# Patient Record
Sex: FEMALE | Race: WHITE | NOT HISPANIC OR LATINO | Employment: STUDENT | URBAN - METROPOLITAN AREA
[De-identification: names, ages, dates, MRNs, and addresses within clinical notes are randomized per-mention and may not be internally consistent; named-entity substitution may affect disease eponyms.]

---
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
## 2022-01-05 ENCOUNTER — ANNUAL EXAM (OUTPATIENT)
Dept: OBGYN CLINIC | Facility: CLINIC | Age: 22
End: 2022-01-05
Payer: COMMERCIAL

## 2022-01-05 VITALS
SYSTOLIC BLOOD PRESSURE: 98 MMHG | BODY MASS INDEX: 23.16 KG/M2 | WEIGHT: 118 LBS | DIASTOLIC BLOOD PRESSURE: 60 MMHG | HEIGHT: 60 IN

## 2022-01-05 DIAGNOSIS — Z30.011 ENCOUNTER FOR PRESCRIPTION OF ORAL CONTRACEPTIVES: ICD-10-CM

## 2022-01-05 DIAGNOSIS — Z01.419 ENCOUNTER FOR GYNECOLOGICAL EXAMINATION WITHOUT ABNORMAL FINDING: Primary | ICD-10-CM

## 2022-01-05 DIAGNOSIS — Z12.4 ENCOUNTER FOR PAPANICOLAOU SMEAR FOR CERVICAL CANCER SCREENING: ICD-10-CM

## 2022-01-05 PROCEDURE — S0610 ANNUAL GYNECOLOGICAL EXAMINA: HCPCS | Performed by: NURSE PRACTITIONER

## 2022-01-05 RX ORDER — NORGESTIMATE AND ETHINYL ESTRADIOL 7DAYSX3 28
KIT ORAL
COMMUNITY
Start: 2021-11-02 | End: 2022-01-09 | Stop reason: SDUPTHER

## 2022-01-05 NOTE — PATIENT INSTRUCTIONS
Pap every 3 years if normal, STI testing as indicated, exercise most days of week, obtain appropriate diet and hydration, Calcium 1000mg + 600 vit D daily, birth control as directed  Condom use when sexually active for sexually transmitted infection prevention      Annual mammogram starting at age 36, monthly breast self exam

## 2022-01-05 NOTE — PROGRESS NOTES
Assessment/Plan:  Pap every 3 years if normal, STI testing as indicated, exercise most days of week, obtain appropriate diet and hydration, Calcium 1000mg + 600 vit D daily, birth control as directed  Condom use when sexually active for sexually transmitted infection prevention     Annual mammogram starting at age 36, monthly breast self exam        Diagnoses and all orders for this visit:    Encounter for gynecological examination without abnormal finding  -     Thinprep Tis Pap (Refl) HPV mRNA E6/E7    Encounter for Papanicolaou smear for cervical cancer screening  -     Thinprep Tis Pap (Refl) HPV mRNA E6/E7    Encounter for prescription of oral contraceptives  -     Tri Femynor 0 18/0 215/0 25 MG-35 MCG per tablet; Take 1 tablet by mouth daily    Other orders  -     Discontinue: Tri Femynor 0 18/0 215/0 25 MG-35 MCG per tablet          Subjective:      Patient ID: Milo Crook is a 24 y o  female  Here for annual gyn On OCP periods monthly normal no issues  Denies abdominal or pelvic pain  Bowel and bladder are normal        The following portions of the patient's history were reviewed and updated as appropriate: allergies, current medications, past family history, past medical history, past social history, past surgical history and problem list     Review of Systems   Constitutional: Negative for fatigue and unexpected weight change  Respiratory: Negative for shortness of breath  Cardiovascular: Negative for chest pain and palpitations  Gastrointestinal: Negative for abdominal distention, abdominal pain, constipation and diarrhea  Genitourinary: Negative for difficulty urinating, dyspareunia, dysuria, frequency, genital sores, menstrual problem, pelvic pain, urgency, vaginal bleeding, vaginal discharge and vaginal pain  Neurological: Negative for headaches  Psychiatric/Behavioral: Negative  Negative for dysphoric mood  The patient is not nervous/anxious            Objective:      BP 98/60   Ht 5' 0 25" (1 53 m)   Wt 53 5 kg (118 lb)   LMP 12/13/2021 (Exact Date)   Breastfeeding No   BMI 22 85 kg/m²          Physical Exam  Vitals and nursing note reviewed  Constitutional:       General: She is not in acute distress  Appearance: Normal appearance  HENT:      Head: Normocephalic and atraumatic  Pulmonary:      Effort: Pulmonary effort is normal    Chest:   Breasts: Breasts are symmetrical       Right: Normal  No mass, nipple discharge, skin change, tenderness or axillary adenopathy  Left: Normal  No mass, nipple discharge, skin change, tenderness or axillary adenopathy  Abdominal:      General: There is no distension  Palpations: Abdomen is soft  Tenderness: There is no abdominal tenderness  There is no guarding or rebound  Genitourinary:     General: Normal vulva  Exam position: Lithotomy position  Labia:         Right: No rash, tenderness, lesion or injury  Left: No rash, tenderness, lesion or injury  Urethra: No prolapse, urethral pain, urethral swelling or urethral lesion  Vagina: Normal  No erythema or lesions  Cervix: No cervical motion tenderness, discharge, lesion or cervical bleeding  Uterus: Normal        Adnexa: Right adnexa normal and left adnexa normal         Right: No mass or tenderness  Left: No mass or tenderness  Rectum: No mass or external hemorrhoid  Comments: PAP from cervix  Musculoskeletal:         General: Normal range of motion  Lymphadenopathy:      Upper Body:      Right upper body: No axillary adenopathy  Left upper body: No axillary adenopathy  Lower Body: No right inguinal adenopathy  No left inguinal adenopathy  Skin:     General: Skin is warm and dry  Neurological:      Mental Status: She is alert and oriented to person, place, and time     Psychiatric:         Mood and Affect: Mood normal          Behavior: Behavior normal          Thought Content: Thought content normal          Judgment: Judgment normal

## 2022-01-09 LAB
CLINICAL INFO: NORMAL
CYTO CVX: NORMAL
CYTOLOGY CMNT CVX/VAG CYTO-IMP: NORMAL
DATE PREVIOUS BX: NORMAL
LMP START DATE: NORMAL
SL AMB PREV. PAP:: NORMAL
SPECIMEN SOURCE CVX/VAG CYTO: NORMAL

## 2022-01-09 RX ORDER — NORGESTIMATE AND ETHINYL ESTRADIOL 7DAYSX3 28
1 KIT ORAL DAILY
Qty: 84 TABLET | Refills: 3 | Status: SHIPPED | OUTPATIENT
Start: 2022-01-09

## 2022-01-11 ENCOUNTER — TELEPHONE (OUTPATIENT)
Dept: OBGYN CLINIC | Facility: CLINIC | Age: 22
End: 2022-01-11

## 2022-01-11 NOTE — TELEPHONE ENCOUNTER
Eduardo(mom) jad/iglesia eaton was in for her well exam last week  Needs one year supply of OCP sent to [facility]   Per epic med log, one year supply transmitted to pharmacy on 1/9/2022

## 2023-01-03 NOTE — PROGRESS NOTES
Assessment/Plan:  Pap every 3 years if normal, STI testing as indicated, exercise most days of week, obtain appropriate diet and hydration, Calcium 1000mg + 600 vit D daily, birth control as directed  Annual mammogram starting at age 36, monthly breast self exam         Diagnoses and all orders for this visit:    Encounter for gynecological examination without abnormal finding    Encounter for prescription of oral contraceptives  -     norgestimate-ethinyl estradiol (ORTHO TRI-CYCLEN,TRINESSA) 0 18/0 215/0 25 MG-35 MCG per tablet; Take 1 tablet by mouth daily    Other orders  -     Liquid-based pap, screening          Subjective:      Patient ID: Tracie Cross is a 25 y o  female  Here for annual gyn On OCP periods monthly normal no issues  Denies BTB unless messes up pills  Denies abdominal or pelvic pain  Bowel and bladder are normal NO unusual discharge NSA PAP 1/5/2022 neg Graduates this May 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 Unsure if will take or look a little bit  The following portions of the patient's history were reviewed and updated as appropriate: allergies, current medications, past family history, past medical history, past social history, past surgical history and problem list     Review of Systems   Constitutional: Negative for fatigue and unexpected weight change  Gastrointestinal: Negative for abdominal distention, abdominal pain, constipation and diarrhea  Genitourinary: Negative for difficulty urinating, dyspareunia, dysuria, frequency, genital sores, menstrual problem, pelvic pain, urgency, vaginal bleeding, vaginal discharge and vaginal pain  Neurological: Negative for headaches  Psychiatric/Behavioral: Negative  Negative for dysphoric mood  The patient is not nervous/anxious            Objective:      /66 (BP Location: Left arm, Patient Position: Sitting, Cuff Size: Standard)   Ht 5' 0 25" (1 53 m)   Wt 58 4 kg (128 lb 12 8 oz) LMP 12/20/2022 (Exact Date)   Breastfeeding No   BMI 24 95 kg/m²          Physical Exam  Vitals and nursing note reviewed  Constitutional:       General: She is not in acute distress  Appearance: Normal appearance  HENT:      Head: Normocephalic and atraumatic  Pulmonary:      Effort: Pulmonary effort is normal    Chest:   Breasts:     Breasts are symmetrical       Right: Normal  No mass, nipple discharge, skin change or tenderness  Left: Normal  No mass, nipple discharge, skin change or tenderness  Abdominal:      General: There is no distension  Palpations: Abdomen is soft  Tenderness: There is no abdominal tenderness  There is no guarding or rebound  Genitourinary:     General: Normal vulva  Exam position: Lithotomy position  Labia:         Right: No rash, tenderness, lesion or injury  Left: No rash, tenderness, lesion or injury  Urethra: No prolapse, urethral pain, urethral swelling or urethral lesion  Vagina: Normal  No erythema or lesions  Cervix: No cervical motion tenderness, discharge, lesion or cervical bleeding  Uterus: Normal        Adnexa: Right adnexa normal and left adnexa normal         Right: No mass or tenderness  Left: No mass or tenderness  Rectum: No mass or external hemorrhoid  Musculoskeletal:         General: Normal range of motion  Lymphadenopathy:      Upper Body:      Right upper body: No axillary adenopathy  Left upper body: No axillary adenopathy  Lower Body: No right inguinal adenopathy  No left inguinal adenopathy  Skin:     General: Skin is warm and dry  Neurological:      Mental Status: She is alert and oriented to person, place, and time  Psychiatric:         Mood and Affect: Mood normal          Behavior: Behavior normal          Thought Content:  Thought content normal          Judgment: Judgment normal

## 2023-01-04 ENCOUNTER — ANNUAL EXAM (OUTPATIENT)
Dept: OBGYN CLINIC | Facility: CLINIC | Age: 23
End: 2023-01-04

## 2023-01-04 VITALS
DIASTOLIC BLOOD PRESSURE: 66 MMHG | SYSTOLIC BLOOD PRESSURE: 100 MMHG | WEIGHT: 128.8 LBS | HEIGHT: 60 IN | BODY MASS INDEX: 25.29 KG/M2

## 2023-01-04 DIAGNOSIS — Z30.011 ENCOUNTER FOR PRESCRIPTION OF ORAL CONTRACEPTIVES: ICD-10-CM

## 2023-01-04 DIAGNOSIS — Z01.419 ENCOUNTER FOR GYNECOLOGICAL EXAMINATION WITHOUT ABNORMAL FINDING: Primary | ICD-10-CM

## 2023-01-04 RX ORDER — NORGESTIMATE AND ETHINYL ESTRADIOL 7DAYSX3 28
1 KIT ORAL DAILY
Qty: 84 TABLET | Refills: 3 | Status: SHIPPED | OUTPATIENT
Start: 2023-01-04

## 2023-01-04 NOTE — PATIENT INSTRUCTIONS
Pap every 3 years if normal, STI testing as indicated, exercise most days of week, obtain appropriate diet and hydration, Calcium 1000mg + 600 vit D daily, birth control as directed  Annual mammogram starting at age 36, monthly breast self exam

## 2023-09-06 ENCOUNTER — TELEPHONE (OUTPATIENT)
Dept: OBGYN CLINIC | Facility: CLINIC | Age: 23
End: 2023-09-06

## 2023-09-06 NOTE — TELEPHONE ENCOUNTER
Pt called the office informing she was recently diagnosed with Myasthenia gravis and is currently on OCP. Pt is considering stopping the birth control and inquiring what side effects she may have stopping birth control. Pt reports she was on the pill to help with acne and irregular cycles. Pt questioning if Berry De Jesus NP has any input regarding stopping or continuing on medication.

## 2023-09-06 NOTE — TELEPHONE ENCOUNTER
Informed patient that Merary Torres pre her research, OCP is safe with Myasthenia she can confirm with neurologist.  If she decides to come off the pill she should finish a pack and not restart another  Periods may be irregular when first coming off and if SA would need to use other contraception as soon as she comes off. Pt verbalized an understanding.

## 2024-01-29 ENCOUNTER — ANNUAL EXAM (OUTPATIENT)
Dept: OBGYN CLINIC | Facility: CLINIC | Age: 24
End: 2024-01-29
Payer: COMMERCIAL

## 2024-01-29 VITALS
BODY MASS INDEX: 25.25 KG/M2 | WEIGHT: 128.6 LBS | HEIGHT: 60 IN | SYSTOLIC BLOOD PRESSURE: 108 MMHG | DIASTOLIC BLOOD PRESSURE: 60 MMHG

## 2024-01-29 DIAGNOSIS — G70.00 MYASTHENIA GRAVIS (HCC): ICD-10-CM

## 2024-01-29 DIAGNOSIS — Z01.419 ENCOUNTER FOR GYNECOLOGICAL EXAMINATION WITHOUT ABNORMAL FINDING: Primary | ICD-10-CM

## 2024-01-29 PROCEDURE — S0612 ANNUAL GYNECOLOGICAL EXAMINA: HCPCS | Performed by: NURSE PRACTITIONER

## 2024-01-29 RX ORDER — PREDNISONE 5 MG/1
5 TABLET ORAL DAILY
COMMUNITY

## 2024-01-29 RX ORDER — PYRIDOSTIGMINE BROMIDE 60 MG/1
60 TABLET ORAL AS NEEDED
COMMUNITY
Start: 2023-12-20

## 2024-01-29 RX ORDER — PREDNISONE 1 MG/1
1 TABLET ORAL
COMMUNITY
Start: 2023-10-26

## 2024-01-29 RX ORDER — IMMUNE GLOBULIN INFUSION (HUMAN) 100 MG/ML
INJECTION, SOLUTION INTRAVENOUS; SUBCUTANEOUS
COMMUNITY
Start: 2023-10-17

## 2024-01-29 NOTE — PROGRESS NOTES
"Assessment/Plan:    Pap every 3 years if normal, STI testing as indicated, exercise most days of week, obtain appropriate diet and hydration, Calcium 1000mg + 600 vit D daily, .   Annual mammogram starting at age 40, monthly breast self exam.      Diagnoses and all orders for this visit:    Encounter for gynecological examination without abnormal finding    Myasthenia gravis (HCC)    Other orders  -     immune globulin, human (GAMMAGARD) 30 GM/300ML  -     predniSONE 1 mg tablet; 1 mg 3 x day  -     predniSONE 5 mg tablet; Take 5 mg by mouth daily  -     pyridostigmine (MESTINON) 60 mg tablet; Take 60 mg by mouth if needed          Subjective:      Patient ID: Bel Gross is a 23 y.o. female.    Here for annual gyn Came off OCP Doing well periods monthly normal no issues. Denies abdominal or pelvic pain.  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    Review of Systems   Constitutional:  Negative for fatigue and unexpected weight change.   Gastrointestinal:  Negative for abdominal distention, abdominal pain, constipation and diarrhea.   Genitourinary:  Negative for difficulty urinating, dyspareunia, dysuria, frequency, genital sores, menstrual problem, pelvic pain, urgency, vaginal bleeding, vaginal discharge and vaginal pain.   Neurological:  Negative for headaches.   Psychiatric/Behavioral: Negative.  Negative for dysphoric mood. The patient is not nervous/anxious.          Objective:      /60   Ht 5' 0.25\" (1.53 m)   Wt 58.3 kg (128 lb 9.6 oz)   LMP 01/11/2024 " (Exact Date)   Breastfeeding No   BMI 24.91 kg/m²          Physical Exam  Vitals and nursing note reviewed.   Constitutional:       General: She is not in acute distress.     Appearance: Normal appearance.   HENT:      Head: Normocephalic and atraumatic.   Pulmonary:      Effort: Pulmonary effort is normal.   Chest:   Breasts:     Breasts are symmetrical.      Right: Normal. No mass, nipple discharge, skin change or tenderness.      Left: Normal. No mass, nipple discharge, skin change or tenderness.   Abdominal:      General: There is no distension.      Palpations: Abdomen is soft.      Tenderness: There is no abdominal tenderness. There is no guarding or rebound.   Genitourinary:     General: Normal vulva.      Exam position: Lithotomy position.      Labia:         Right: No rash, tenderness, lesion or injury.         Left: No rash, tenderness, lesion or injury.       Urethra: No prolapse, urethral pain, urethral swelling or urethral lesion.      Vagina: Normal. No erythema or lesions.      Cervix: No cervical motion tenderness, discharge, lesion or cervical bleeding.      Uterus: Normal.       Adnexa: Right adnexa normal and left adnexa normal.        Right: No mass or tenderness.          Left: No mass or tenderness.        Rectum: No mass or external hemorrhoid.   Musculoskeletal:         General: Normal range of motion.   Lymphadenopathy:      Upper Body:      Right upper body: No axillary adenopathy.      Left upper body: No axillary adenopathy.      Lower Body: No right inguinal adenopathy. No left inguinal adenopathy.   Skin:     General: Skin is warm and dry.   Neurological:      Mental Status: She is alert and oriented to person, place, and time.   Psychiatric:         Mood and Affect: Mood normal.         Behavior: Behavior normal.         Thought Content: Thought content normal.         Judgment: Judgment normal.

## 2024-05-31 ENCOUNTER — TELEPHONE (OUTPATIENT)
Dept: NEUROLOGY | Facility: CLINIC | Age: 24
End: 2024-05-31

## 2024-06-03 NOTE — PROGRESS NOTES
Patient ID: Bel Gross is a 24 y.o. female.    Assessment/Plan:    Myasthenia gravis (HCC)  This patient has a history of seropositive myasthenia gravis, diagnosed in May 2023.  She was started on prednisone 10 mg daily and IVIG was initiated in September 2023.  She has successfully weaned prednisone to 4 mg daily and underwent robotic assisted thymectomy on 5/21/2024.  She continues to do well.  MG ADL score is 1.  She is most likely reached pharmacologic remission.  She is reluctant to wean her prednisone dose.  We discussed the side effects of long-term prednisone use.  She understands that lowering the dose slowly will lessen the possibility of her having another myasthenia exacerbation.  We can subsequently taper the IVIG use as well if she continues to do well.  In the future, we can discuss newer medications including efgartigimod,, Ravulizumab and some of the other new drugs should she have any worsening.    Recommendations:  -Continue prednisone 4 mg daily  -Agree with her [facility] neurologist to wean to 3 mg daily at next visit with him  -Agree that she should continue to wean the prednisone to off if she can tolerate it  -We can subsequently try to wean the IVIG  -Resume Mestinon if needed  -If she has any clinical worsening, we can consider Fc receptor inhibitor or complement inhibitor  -She will obtain the previous antibody testing and sent to me through the portal or bring with her to her next appointment  -She is considering transferring care to [facility] as it is an easier drive for her.  We will schedule a follow-up for 3 months which she can certainly cancel if she decides to continue at [facility]    Follow-up in 3 months or sooner if difficulties    I have spent a total time of 50 minutes on 06/04/24 in caring for this patient including Prognosis, Risks and benefits of tx options, Instructions for management, Patient and family education, Importance of tx compliance, Risk  factor reductions, Impressions, Counseling / Coordination of care, Documenting in the medical record, Reviewing / ordering tests, medicine, procedures  , and Obtaining or reviewing history  .     Subjective:    Ms Gross is a 24-year-old right handed lady with PMH antibody positive myasthenia gravis, who presents for new neuromuscular evaluation of myasthenia gravis.  Incidentally, she was seen at [facility] by Dr. Otoniel Coats in late April 2024. She is here with her mom.  She was seen today with the third-year medical student.  We completed the history and physical exam together.    The patient came to see me to try to establish care more locally.  It is difficult for her to see the neurologist at [facility].    While she was in school in [place] in May 2023, she developed facial weakness, slurred speech, could not use her lips properly and could not use her straw.  She was having dysphagia.  She was evaluated by neurology in [place] and there was high clinical suspicion for myasthenia gravis.  She was started on prednisone 10 mg daily and Mestinon.  Subsequently, she tested + for myasthenia gravis through serologic testing.  Unfortunately, I do not have those results for review.    When she completed school and return to [place], she was seen at [facility] by Dr Otoniel Coats.  He maintained her on prednisone and initiated IVIG in September 2023 to wean the prednisone and prepare her for thymectomy.    She has successfully weaned to 3 mg of prednisone daily, is on IVIG every 4 weeks.  She is doing well with the wean.  MG ADL score is 1.    She has never required greater than 10 mg of prednisone daily.  She has not experienced any exacerbations with weaning the dosage.    Last dosage of Mestinon was several months ago when she sensed an issue with her throat.  There was mild improvement.  She has not required it since then.    She underwent robotic assisted thymectmomy at [facility]  [place] 2 weeks ago.  She has had no complications postoperatively and has a follow-up appointment with the surgeon in 2 weeks.  She also has a follow-up appointment at [facility] in 2 weeks.    She has been tapering 1 mg per month. She has not felt any worse on the taper.           PMH: Myasthenia gravis  PSH: thymectomy, wisdom, teeth  Social: No tobacco, rare alcohol, no drugs.  She was working as a .  She has a degree in cyber security.  Family: Paternal aunt had rheumatoid arthritis.  Great grandmother had colon cancer.  Other family members have hypertension and hyperlipidemia.           Objective:    Blood pressure 108/74, pulse 88, temperature 97.9 °F (36.6 °C), temperature source Temporal, weight 56.1 kg (123 lb 9.6 oz), SpO2 92%, not currently breastfeeding.    Physical Exam  Constitutional:       Appearance: Normal appearance.   HENT:      Mouth/Throat:      Mouth: Mucous membranes are moist.      Pharynx: Oropharynx is clear.   Eyes:      Conjunctiva/sclera: Conjunctivae normal.   Neck:      Vascular: No carotid bruit.   Cardiovascular:      Rate and Rhythm: Normal rate and regular rhythm.      Heart sounds: Normal heart sounds.   Pulmonary:      Effort: Pulmonary effort is normal.      Breath sounds: Normal breath sounds.   Skin:     General: Skin is warm and dry.   Psychiatric:         Mood and Affect: Mood normal.         Behavior: Behavior normal.         Neurological Exam  Mental status: Awake, alert, oriented to person place and time.  Naming, knowledge, repetition, concentration and recall intact.    Cranial nerves: Extraocular movements intact. No fatiguable ptosis.  Pupils equally round and reactive to light.  Visual fields full to confrontation.  Facial sensation intact.  Face symmetric.  Speech clear. Hearing intact.  Tongue, uvula, palate midline and intact.  Sternocleidomastoid intact.    Motor:   Neck flexion: 5-/5  Neck extension 5/5  Deltoid: Right 5/5, left  5/5  Biceps: Right 5/5, left 5/5  Triceps: Right 5/5, left 5/5  : Right 5/5, left 5/5  Intrinsic hand muscles: Right 4/5, left 4+/5    Iliopsoas: Right 5/5, left 5/5  Quadriceps: Right 5/5, left 5/5  Tibialis anterior: Right 5/5, left 5/5  Medial gastrocnemius: Right 5/5, left 5/5    Cerebellar: No dysmetria.  Heel-to-shin intact.  Gait normal.    Reflexes:   Biceps: Right 2+, left 2+  Triceps: Right 2+, left 2+  Brachioradialis: Right 2+, left 2+  Patellar: Right 2+, left 2+  Achilles: Right 2+, left 2+    Sensory: Light touch, temperature, vibration sensation intact.  Romberg negative.      MG ADL total = 1  Speech 0  Chewing 0  Swallowing 0  Breathing 1  Upper extremities 0  Lower extremity 0  Diplopia 0  Ptosis 0      ROS:    Review of Systems  Constitutional:  Negative for appetite change, fatigue and fever.   HENT:  Negative for hearing loss, tinnitus, trouble swallowing (none) and voice change.    Eyes: Negative.  Negative for photophobia, pain and visual disturbance.   Respiratory: Negative.  Negative for shortness of breath.    Cardiovascular: Negative.  Negative for palpitations.   Gastrointestinal: Negative.  Negative for nausea and vomiting.   Endocrine: Negative.  Negative for cold intolerance.   Genitourinary: Negative.  Negative for dysuria, frequency and urgency.   Musculoskeletal:  Negative for back pain, gait problem, myalgias, neck pain and neck stiffness.   Skin: Negative.  Negative for rash.   Allergic/Immunologic: Negative.    Neurological: Negative.  Negative for dizziness, tremors, seizures, syncope, facial asymmetry, speech difficulty, weakness, light-headedness, numbness and headaches.   Hematological: Negative.  Does not bruise/bleed easily.   Psychiatric/Behavioral:  Positive for sleep disturbance (freqent waking during night). Negative for confusion and hallucinations.       DATA:  Thymus biopsy 5/21/2024:  A. Thymus and anterior mediastinal tissue; Resection:   - Thymic parenchyma  with follicular lymphoid hyperplasia, no tumor seen.   - Five lymph nodes, no tumor seen (0/5).     [facility] neurology notes reviewed

## 2024-06-04 ENCOUNTER — OFFICE VISIT (OUTPATIENT)
Dept: NEUROLOGY | Facility: CLINIC | Age: 24
End: 2024-06-04
Payer: COMMERCIAL

## 2024-06-04 VITALS
BODY MASS INDEX: 23.94 KG/M2 | DIASTOLIC BLOOD PRESSURE: 74 MMHG | WEIGHT: 123.6 LBS | HEART RATE: 88 BPM | OXYGEN SATURATION: 92 % | SYSTOLIC BLOOD PRESSURE: 108 MMHG | TEMPERATURE: 97.9 F

## 2024-06-04 DIAGNOSIS — G70.00 MYASTHENIA GRAVIS (HCC): Primary | ICD-10-CM

## 2024-06-04 PROCEDURE — 99204 OFFICE O/P NEW MOD 45 MIN: CPT | Performed by: PSYCHIATRY & NEUROLOGY

## 2024-06-04 RX ORDER — FAMOTIDINE 20 MG/1
20 TABLET, FILM COATED ORAL 2 TIMES DAILY
COMMUNITY

## 2024-06-04 RX ORDER — CELECOXIB 200 MG/1
200 CAPSULE ORAL 2 TIMES DAILY
COMMUNITY

## 2024-06-04 RX ORDER — GABAPENTIN 300 MG/1
300 CAPSULE ORAL 2 TIMES DAILY
COMMUNITY

## 2024-06-04 NOTE — PROGRESS NOTES
Patient ID: Bel Gross is a 24 y.o. female.    Assessment/Plan:    No problem-specific Assessment & Plan notes found for this encounter.       {Assess/PlanSmartLinks:71899}       Subjective:    HPI    {[facility] Neurology HPI texts:05896}    {Common ambulatory SmartLinks:56871}         Objective:    Blood pressure 108/74, pulse 88, temperature 97.9 °F (36.6 °C), temperature source Temporal, weight 56.1 kg (123 lb 9.6 oz), SpO2 92%, not currently breastfeeding.    Physical Exam    Neurological Exam      ROS:    Review of Systems   Constitutional:  Negative for appetite change, fatigue and fever.   HENT:  Negative for hearing loss, tinnitus, trouble swallowing (none) and voice change.    Eyes: Negative.  Negative for photophobia, pain and visual disturbance.   Respiratory: Negative.  Negative for shortness of breath.    Cardiovascular: Negative.  Negative for palpitations.   Gastrointestinal: Negative.  Negative for nausea and vomiting.   Endocrine: Negative.  Negative for cold intolerance.   Genitourinary: Negative.  Negative for dysuria, frequency and urgency.   Musculoskeletal:  Negative for back pain, gait problem, myalgias, neck pain and neck stiffness.   Skin: Negative.  Negative for rash.   Allergic/Immunologic: Negative.    Neurological: Negative.  Negative for dizziness, tremors, seizures, syncope, facial asymmetry, speech difficulty, weakness, light-headedness, numbness and headaches.   Hematological: Negative.  Does not bruise/bleed easily.   Psychiatric/Behavioral:  Positive for sleep disturbance (freqent waking during night). Negative for confusion and hallucinations.

## 2024-06-04 NOTE — ASSESSMENT & PLAN NOTE
This patient has a history of seropositive myasthenia gravis, diagnosed in May 2023.  She was started on prednisone 10 mg daily and IVIG was initiated in September 2023.  She has successfully weaned prednisone to 4 mg daily and underwent robotic assisted thymectomy on 5/21/2024.  She continues to do well.  MG ADL score is 1.  She is most likely reached pharmacologic remission.  She is reluctant to wean her prednisone dose.  We discussed the side effects of long-term prednisone use.  She understands that lowering the dose slowly will lessen the possibility of her having another myasthenia exacerbation.  We can subsequently taper the IVIG use as well if she continues to do well.  In the future, we can discuss newer medications including efgartigimod,, Ravulizumab and some of the other new drugs should she have any worsening.    Recommendations:  -Continue prednisone 4 mg daily  -Agree with her [facility] neurologist to wean to 3 mg daily at next visit with him  -Agree that she should continue to wean the prednisone to off if she can tolerate it  -We can subsequently try to wean the IVIG  -Resume Mestinon if needed  -If she has any clinical worsening, we can consider Fc receptor inhibitor or complement inhibitor  -She will obtain the previous antibody testing and sent to me through the portal or bring with her to her next appointment  -She is considering transferring care to [facility] as it is an easier drive for her.  We will schedule a follow-up for 3 months which she can certainly cancel if she decides to continue at [facility]

## 2024-10-21 ENCOUNTER — APPOINTMENT (OUTPATIENT)
Dept: LAB | Facility: HOSPITAL | Age: 24
End: 2024-10-21
Payer: COMMERCIAL

## 2024-10-21 ENCOUNTER — OFFICE VISIT (OUTPATIENT)
Dept: ENDOCRINOLOGY | Facility: HOSPITAL | Age: 24
End: 2024-10-21
Payer: COMMERCIAL

## 2024-10-21 VITALS
HEART RATE: 100 BPM | DIASTOLIC BLOOD PRESSURE: 74 MMHG | WEIGHT: 112.6 LBS | BODY MASS INDEX: 22.1 KG/M2 | HEIGHT: 60 IN | SYSTOLIC BLOOD PRESSURE: 110 MMHG

## 2024-10-21 DIAGNOSIS — R68.89 HEAT INTOLERANCE: ICD-10-CM

## 2024-10-21 DIAGNOSIS — R68.89 HEAT INTOLERANCE: Primary | ICD-10-CM

## 2024-10-21 DIAGNOSIS — R61 DIAPHORESIS: ICD-10-CM

## 2024-10-21 DIAGNOSIS — E06.3 HASHIMOTO'S THYROIDITIS: ICD-10-CM

## 2024-10-21 LAB
T3FREE SERPL-MCNC: 4.5 PG/ML (ref 2.5–3.9)
T4 FREE SERPL-MCNC: 0.74 NG/DL (ref 0.61–1.12)
TSH SERPL DL<=0.05 MIU/L-ACNC: 3.98 UIU/ML (ref 0.45–4.5)

## 2024-10-21 PROCEDURE — 83835 ASSAY OF METANEPHRINES: CPT

## 2024-10-21 PROCEDURE — 84443 ASSAY THYROID STIM HORMONE: CPT

## 2024-10-21 PROCEDURE — 84481 FREE ASSAY (FT-3): CPT

## 2024-10-21 PROCEDURE — 84439 ASSAY OF FREE THYROXINE: CPT

## 2024-10-21 PROCEDURE — 99244 OFF/OP CNSLTJ NEW/EST MOD 40: CPT | Performed by: STUDENT IN AN ORGANIZED HEALTH CARE EDUCATION/TRAINING PROGRAM

## 2024-10-21 PROCEDURE — 36415 COLL VENOUS BLD VENIPUNCTURE: CPT

## 2024-10-21 PROCEDURE — 83520 IMMUNOASSAY QUANT NOS NONAB: CPT

## 2024-10-21 PROCEDURE — 84305 ASSAY OF SOMATOMEDIN: CPT

## 2024-10-21 NOTE — PROGRESS NOTES
10/21/2024    Assessment & Plan        Problem List Items Addressed This Visit          Endocrine    Hashimoto's thyroiditis    Relevant Orders    Thyrotropin receptor antibody    T4, free    TSH, 3rd generation    T3, free    Metanephrine, Fractionated Plasma Free    Insulin-like growth factor 1 (IGF-1)       Other    Diaphoresis    Relevant Orders    Thyrotropin receptor antibody    T4, free    TSH, 3rd generation    T3, free    Metanephrine, Fractionated Plasma Free    Insulin-like growth factor 1 (IGF-1)    Heat intolerance - Primary    Relevant Orders    Thyrotropin receptor antibody    T4, free    TSH, 3rd generation    T3, free    Metanephrine, Fractionated Plasma Free    Insulin-like growth factor 1 (IGF-1)       Assessment/Plan:  Patient is a 24yF with MG, hashimoto without hypothyroidism presents to re-establish care d/t symptoms of diaphoresis    1) Heat intolerance:- clinically does not seem like PCC or Acromegaly since her only symptoms are diaphoresis and heat intolerance. But can screen for both.     2) hashimoto:- Last TSH 2023 was euthyroid. Discussed having positive Ab means risk of hypothyroidism but does not indicate active hypothyroid. Plus her symptoms are more hypermetabolism rather hypo. We can check TRAb as welll for risk stratification. TSI was neg. Discussed if TFT normal. Would simply recommend checking TFT every 1 year for monitoring.     RTC in 1 year      CC: hashimoto    History of Present Illness     HPI: Bel Gross is a 24 y.o. year old female with history of hashimoto without hypothyroidism, MG on IVIG every 6 weeks who presents today d/t persistant symptoms of heat intolerance. Patient was last seen by Dr Hutton in 2018 with similar symptoms and had workup for GH, IGF, testosterone, meta and thyroid which showed positive tpo and tg ab. Neg tsi. Reports her symptoms persist along with having some cold hands at times and hence here for reevaluation.     Reports since  last visit, was diagnosed with MG and also had thymectomy. Was on steroids and gained weight then from 118 to 132. Then dropped to 110-112 now. Does report poor appetite when stressed and feels stressed right now. Reports heat intolerance and sweating in armpits, under breast areas. Drinking cold water helps. Denies any palpitations, tremors, diarrhea or HA during the episode. Denies any symptoms of overt hunger. Denies any fractures, hirsutism. Denies any diarrhea/constipation, dry skin, brittle nails, hair loss.     Family hx- reviewed in chart  Social Hx- studied avation but had to stop d/t MG, works as cyber security    Review of Systems   Constitutional:  Positive for fatigue. Negative for unexpected weight change.   HENT:  Negative for trouble swallowing and voice change.    Eyes:  Negative for photophobia and visual disturbance.   Respiratory:  Negative for choking and shortness of breath.    Gastrointestinal:  Negative for constipation and diarrhea.   Endocrine: Positive for heat intolerance. Negative for cold intolerance.   Musculoskeletal:  Negative for arthralgias and myalgias.   Skin:  Negative for rash.       Historical Information   Past Medical History:   Diagnosis Date    Acne     Myasthenia gravis (HCC) 2023    UTI (urinary tract infection) 12/2021     Past Surgical History:   Procedure Laterality Date    WISDOM TOOTH EXTRACTION       Social History   Social History     Substance and Sexual Activity   Alcohol Use Not Currently    Comment: rare     Social History     Substance and Sexual Activity   Drug Use No     Social History     Tobacco Use   Smoking Status Never   Smokeless Tobacco Never     Family History:   Family History   Problem Relation Age of Onset    Gestational diabetes Mother     No Known Problems Father     No Known Problems Sister     Asthma Brother     Other Brother         PVCS, PEANUT ALLERGY.    Thrombocytopenia Brother         itp when younger    Hypothyroidism Maternal  "Grandmother     Heart disease Maternal Grandmother     Hypertension Maternal Grandmother     Osteoporosis Maternal Grandmother     Thyroid disease Maternal Grandmother     Transient ischemic attack Maternal Grandmother     Hyperlipidemia Maternal Grandfather     Hypertension Maternal Grandfather     Alzheimer's disease Maternal Grandfather     Diabetes unspecified Paternal Grandmother     Heart attack Paternal Grandmother     Hyperlipidemia Paternal Grandmother     Hypertension Paternal Grandmother     Heart failure Paternal Grandfather     Hyperlipidemia Paternal Grandfather     Thyroid disease Paternal Grandfather     Breast cancer Neg Hx     Uterine cancer Neg Hx     Ovarian cancer Neg Hx     Colon cancer Neg Hx        Meds/Allergies   Current Outpatient Medications   Medication Sig Dispense Refill    immune globulin, human (GAMMAGARD) 20 GM/200ML Inject 40 mg into a catheter in a vein 1 day every 6 weeks.      pyridostigmine (MESTINON) 60 mg tablet Take 60 mg by mouth if needed      aluminum chloride (DRYSOL) 20 % external solution Apply topically daily at bedtime (Patient not taking: Reported on 1/5/2022) 35 mL 1    norgestimate-ethinyl estradiol (ORTHO TRI-CYCLEN,TRINESSA) 0.18/0.215/0.25 MG-35 MCG per tablet Take 1 tablet by mouth daily (Patient not taking: Reported on 1/29/2024) 84 tablet 3     No current facility-administered medications for this visit.     No Known Allergies    Objective   Vitals: Blood pressure 110/74, pulse 100, height 5' 0.25\" (1.53 m), weight 51.1 kg (112 lb 9.6 oz), not currently breastfeeding.  Invasive Devices       None                 Body mass index is 21.81 kg/m².  /74   Pulse 100   Ht 5' 0.25\" (1.53 m)   Wt 51.1 kg (112 lb 9.6 oz)   BMI 21.81 kg/m²    Wt Readings from Last 3 Encounters:   10/21/24 51.1 kg (112 lb 9.6 oz)   06/04/24 56.1 kg (123 lb 9.6 oz)   01/29/24 58.3 kg (128 lb 9.6 oz)     Physical Exam  Constitutional:       Appearance: Normal appearance. She is " "normal weight.   Cardiovascular:      Rate and Rhythm: Normal rate and regular rhythm.      Pulses: Normal pulses.   Pulmonary:      Effort: Pulmonary effort is normal.   Abdominal:      General: Abdomen is flat. Bowel sounds are normal.      Palpations: Abdomen is soft.   Skin:     General: Skin is warm and dry.      Capillary Refill: Capillary refill takes less than 2 seconds.   Neurological:      General: No focal deficit present.      Mental Status: She is alert and oriented to person, place, and time.   Psychiatric:         Mood and Affect: Mood normal.         The history was obtained from the review of the chart and from the patient.    Lab Results:      No components found for: \"HA1C\"  No components found for: \"GLU\"    Lab Results   Component Value Date    CREATININE 0.75 06/17/2024    CREATININE 0.72 05/22/2024    CREATININE 0.72 04/30/2024    BUN 10 06/17/2024    K 3.9 06/17/2024     (L) 06/17/2024    CO2 23 06/17/2024     eGFRcr   Date Value Ref Range Status   06/17/2024 114 >=60 mL/min/1.73 m2 Final     Comment:     Estimated glomerular filtration rate (eGFR) is calculated without a race  coefficient. Values should be interpreted in the context of the patient's full  clinical presentation. Reference: Karena Kulkarni, et al. \"A unifying approach  for GFR estimation: recommendations of the NKF-ASN task force on reassessing the  inclusion of race in diagnosing kidney disease.\" American Journal of Kidney  Diseases (2021)     No components found for: \"MALBCRER\"    No results found for: \"CHOL\", \"HDL\", \"LDLCAL\", \"TRIG\", \"CHOLHDL\"    Lab Results   Component Value Date    ALT 15 06/17/2024    AST 19 06/17/2024    ALKPHOS 57 06/17/2024       Lab Results   Component Value Date    TSH 1.19 07/17/2023       Future Appointments   Date Time Provider Department Center   1/9/2025  3:00 PM Lidia García MD [facility]     "

## 2024-10-22 DIAGNOSIS — R68.89 HEAT INTOLERANCE: Primary | ICD-10-CM

## 2024-10-22 LAB — TSH RECEP AB SER-ACNC: <1.1 IU/L (ref 0–1.75)

## 2024-10-23 LAB — IGF-I SERPL-MCNC: 254 NG/ML (ref 101–347)

## 2024-10-24 LAB
METANEPH FREE SERPL-MCNC: <25 PG/ML (ref 0–88)
NORMETANEPHRINE SERPL-MCNC: 32 PG/ML (ref 0–210.1)